# Patient Record
Sex: MALE | Race: WHITE | NOT HISPANIC OR LATINO | Employment: FULL TIME | URBAN - METROPOLITAN AREA
[De-identification: names, ages, dates, MRNs, and addresses within clinical notes are randomized per-mention and may not be internally consistent; named-entity substitution may affect disease eponyms.]

---

## 2023-09-15 PROBLEM — E78.5 HYPERLIPIDEMIA: Status: ACTIVE | Noted: 2023-09-15

## 2023-09-15 PROBLEM — I25.2 HISTORY OF ST ELEVATION MYOCARDIAL INFARCTION (STEMI): Status: ACTIVE | Noted: 2023-09-15

## 2023-09-15 PROBLEM — R21 MACULAR ERUPTION: Status: ACTIVE | Noted: 2023-09-15

## 2023-09-15 PROBLEM — R21 RASH: Status: ACTIVE | Noted: 2023-09-15

## 2023-09-15 PROBLEM — I25.10 ASHD (ARTERIOSCLEROTIC HEART DISEASE): Status: ACTIVE | Noted: 2023-09-15

## 2023-09-15 PROBLEM — Z91.89 AT RISK FOR BLEEDING: Status: ACTIVE | Noted: 2023-09-15

## 2023-09-15 PROBLEM — I21.3 ACUTE ST SEGMENT ELEVATION MYOCARDIAL INFARCTION (MULTI): Status: ACTIVE | Noted: 2023-09-15

## 2023-09-15 PROBLEM — I82.90 VENOUS THROMBOSIS: Status: ACTIVE | Noted: 2019-04-09

## 2023-09-15 PROBLEM — R07.9 CHEST PAIN: Status: ACTIVE | Noted: 2023-09-15

## 2023-09-15 PROBLEM — I25.5 ISCHEMIC CARDIOMYOPATHY: Status: ACTIVE | Noted: 2023-09-15

## 2023-09-15 RX ORDER — CARVEDILOL 3.12 MG/1
3.12 TABLET ORAL 2 TIMES DAILY
COMMUNITY
End: 2023-10-17 | Stop reason: SDUPTHER

## 2023-09-15 RX ORDER — FAMOTIDINE 20 MG/1
20 TABLET, FILM COATED ORAL DAILY
COMMUNITY
End: 2023-10-17 | Stop reason: WASHOUT

## 2023-09-15 RX ORDER — ASPIRIN 81 MG/1
81 TABLET ORAL DAILY
COMMUNITY
End: 2023-10-17 | Stop reason: SDUPTHER

## 2023-09-15 RX ORDER — DIPHENHYDRAMINE HCL 25 MG
25 CAPSULE ORAL EVERY 6 HOURS PRN
COMMUNITY
End: 2023-10-17 | Stop reason: WASHOUT

## 2023-09-15 RX ORDER — ATORVASTATIN CALCIUM 80 MG/1
80 TABLET, FILM COATED ORAL DAILY
COMMUNITY
End: 2023-10-17 | Stop reason: SDUPTHER

## 2023-10-17 ENCOUNTER — OFFICE VISIT (OUTPATIENT)
Dept: CARDIOLOGY | Facility: CLINIC | Age: 48
End: 2023-10-17
Payer: COMMERCIAL

## 2023-10-17 VITALS
SYSTOLIC BLOOD PRESSURE: 128 MMHG | DIASTOLIC BLOOD PRESSURE: 90 MMHG | OXYGEN SATURATION: 96 % | WEIGHT: 251.8 LBS | BODY MASS INDEX: 38.16 KG/M2 | HEIGHT: 68 IN | HEART RATE: 106 BPM

## 2023-10-17 DIAGNOSIS — E78.49 OTHER HYPERLIPIDEMIA: ICD-10-CM

## 2023-10-17 DIAGNOSIS — I25.10 ASHD (ARTERIOSCLEROTIC HEART DISEASE): ICD-10-CM

## 2023-10-17 DIAGNOSIS — I25.2 HISTORY OF ST ELEVATION MYOCARDIAL INFARCTION (STEMI): ICD-10-CM

## 2023-10-17 DIAGNOSIS — E78.5 HYPERLIPIDEMIA, UNSPECIFIED HYPERLIPIDEMIA TYPE: ICD-10-CM

## 2023-10-17 DIAGNOSIS — I25.10 ASHD (ARTERIOSCLEROTIC HEART DISEASE): Primary | ICD-10-CM

## 2023-10-17 PROCEDURE — 99204 OFFICE O/P NEW MOD 45 MIN: CPT | Performed by: INTERNAL MEDICINE

## 2023-10-17 RX ORDER — CARVEDILOL 3.12 MG/1
3.12 TABLET ORAL 2 TIMES DAILY
Qty: 180 TABLET | Refills: 3 | Status: SHIPPED | OUTPATIENT
Start: 2023-10-17 | End: 2024-10-16

## 2023-10-17 RX ORDER — ATORVASTATIN CALCIUM 80 MG/1
80 TABLET, FILM COATED ORAL DAILY
Qty: 90 TABLET | Refills: 3 | Status: SHIPPED | OUTPATIENT
Start: 2023-10-17 | End: 2024-10-16

## 2023-10-17 RX ORDER — ASPIRIN 81 MG/1
81 TABLET ORAL DAILY
Qty: 90 TABLET | Refills: 3 | Status: SHIPPED | OUTPATIENT
Start: 2023-10-17 | End: 2024-10-16

## 2023-10-17 NOTE — LETTER
October 17, 2023       No Recipients    Patient: Gonzalo Lopez   YOB: 1975   Date of Visit: 10/17/2023       Dear Dr. Mcgrath Recipients:    Thank you for referring Gonzalo Lopez to me for evaluation. Below are my notes for this consultation.  If you have questions, please do not hesitate to call me. I look forward to following your patient along with you.       Sincerely,     Moisés Flanagan MD      CC:   No Recipients  ______________________________________________________________________________________    Subjective  Patient ID: Gonzalo Lopez is a 48 y.o. male who presents for historyof MI, Chest Pain, Cardiomyopathy, Hyperlipidemia, and ASHD (Cardiac check up).  Chest Pain     His past medical history is significant for hyperlipidemia.   Hyperlipidemia  Associated symptoms include chest pain.     Patient is a 48-year-old male with a history of hyperlipidemia, coronary artery disease, old myocardial infarction who presents for cardiac evaluation.  He was previously seen at [facility] working out of [place].  He presented with an inferior ST elevation MI.  He had disease in the LAD and RCA.  He underwent revascularization of both of these vessels.  He had LV dysfunction on repeat echocardiogram his LV function had improved to normal.  He is remained on medical therapy including carvedilol, atorvastatin and aspirin therapy.    He denies any significant chest pressure.  He admits to a rare left-sided chest discomfort that he is relating to his back.  He otherwise denies any shortness of breath, dyspnea on exertion, palpitations, syncope, orthopnea, PND, lower extremity edema.  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    Twelve-lead ECG demonstrates normal sinus rhythm, otherwise normal.  Echocardiogram from April 2019 with an ejection fraction of 55 to 60%.  Cardiac catheterization report from 2019 reviewed.    Review  of Systems   Cardiovascular:  Positive for chest pain.   All other systems reviewed and are negative.      Objective  Physical Exam  Vitals reviewed.   Constitutional:       Appearance: Normal appearance.   HENT:      Head: Normocephalic and atraumatic.      Mouth/Throat:      Mouth: Mucous membranes are moist.      Pharynx: Oropharynx is clear.   Cardiovascular:      Rate and Rhythm: Normal rate and regular rhythm.      Pulses: Normal pulses.      Heart sounds: Normal heart sounds.   Pulmonary:      Effort: Pulmonary effort is normal.      Breath sounds: Normal breath sounds.   Abdominal:      General: Bowel sounds are normal.      Palpations: Abdomen is soft.   Musculoskeletal:      Cervical back: Neck supple.   Skin:     General: Skin is warm and dry.   Neurological:      General: No focal deficit present.      Mental Status: He is alert.   Psychiatric:         Mood and Affect: Mood normal.         Behavior: Behavior normal.         Assessment/Plan  Problem List Items Addressed This Visit             ICD-10-CM       Cardiac and Vasculature    ASHD (arteriosclerotic heart disease) - Primary I25.10    Relevant Orders    Lipid Panel    Transthoracic Echo (TTE) Complete    Stress Test    History of ST elevation myocardial infarction (STEMI) I25.2    Relevant Orders    Transthoracic Echo (TTE) Complete    Stress Test    Hyperlipidemia E78.5    Relevant Orders    Lipid Panel    Stress Test   Patient is doing reasonably well from a cardiac standpoint.    Patient would like to start working out again.  To this end, we will go ahead and pursue echocardiogram to ensure that his LV function is normal.  We will pursue a regular treadmill stress test to gauge his exercise tolerance as well as to ensure that there are no ischemic changes.    We will go ahead and check a lipid panel to ensure that his LDL is less than 70.    Patient will otherwise continue beta-blockade, aspirin and statin therapy.    Patient will follow-up  with us in 6 months or sooner if he has more problems.

## 2023-10-17 NOTE — PROGRESS NOTES
Subjective   Patient ID: Gonzalo Lopez is a 48 y.o. male who presents for historyof MI, Chest Pain, Cardiomyopathy, Hyperlipidemia, and ASHD (Cardiac check up).  Chest Pain     His past medical history is significant for hyperlipidemia.   Hyperlipidemia  Associated symptoms include chest pain.     Patient is a 48-year-old male with a history of hyperlipidemia, coronary artery disease, old myocardial infarction who presents for cardiac evaluation.  He was previously seen at [facility] working out of [place].  He presented with an inferior ST elevation MI.  He had disease in the LAD and RCA.  He underwent revascularization of both of these vessels.  He had LV dysfunction on repeat echocardiogram his LV function had improved to normal.  He is remained on medical therapy including carvedilol, atorvastatin and aspirin therapy.    He denies any significant chest pressure.  He admits to a rare left-sided chest discomfort that he is relating to his back.  He otherwise denies any shortness of breath, dyspnea on exertion, palpitations, syncope, orthopnea, PND, lower extremity edema.  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    Twelve-lead ECG demonstrates normal sinus rhythm, otherwise normal.  Echocardiogram from April 2019 with an ejection fraction of 55 to 60%.  Cardiac catheterization report from 2019 reviewed.    Review of Systems   Cardiovascular:  Positive for chest pain.   All other systems reviewed and are negative.      Objective   Physical Exam  Vitals reviewed.   Constitutional:       Appearance: Normal appearance.   HENT:      Head: Normocephalic and atraumatic.      Mouth/Throat:      Mouth: Mucous membranes are moist.      Pharynx: Oropharynx is clear.   Cardiovascular:      Rate and Rhythm: Normal rate and regular rhythm.      Pulses: Normal pulses.      Heart sounds: Normal heart sounds.   Pulmonary:      Effort: Pulmonary  effort is normal.      Breath sounds: Normal breath sounds.   Abdominal:      General: Bowel sounds are normal.      Palpations: Abdomen is soft.   Musculoskeletal:      Cervical back: Neck supple.   Skin:     General: Skin is warm and dry.   Neurological:      General: No focal deficit present.      Mental Status: He is alert.   Psychiatric:         Mood and Affect: Mood normal.         Behavior: Behavior normal.         Assessment/Plan   Problem List Items Addressed This Visit             ICD-10-CM       Cardiac and Vasculature    ASHD (arteriosclerotic heart disease) - Primary I25.10    Relevant Orders    Lipid Panel    Transthoracic Echo (TTE) Complete    Stress Test    History of ST elevation myocardial infarction (STEMI) I25.2    Relevant Orders    Transthoracic Echo (TTE) Complete    Stress Test    Hyperlipidemia E78.5    Relevant Orders    Lipid Panel    Stress Test   Patient is doing reasonably well from a cardiac standpoint.    Patient would like to start working out again.  To this end, we will go ahead and pursue echocardiogram to ensure that his LV function is normal.  We will pursue a regular treadmill stress test to gauge his exercise tolerance as well as to ensure that there are no ischemic changes.    We will go ahead and check a lipid panel to ensure that his LDL is less than 70.    Patient will otherwise continue beta-blockade, aspirin and statin therapy.    Patient will follow-up with us in 6 months or sooner if he has more problems.

## 2023-11-11 ENCOUNTER — LAB (OUTPATIENT)
Dept: LAB | Facility: LAB | Age: 48
End: 2023-11-11
Payer: COMMERCIAL

## 2023-11-11 DIAGNOSIS — I25.10 ASHD (ARTERIOSCLEROTIC HEART DISEASE): ICD-10-CM

## 2023-11-11 DIAGNOSIS — E78.49 OTHER HYPERLIPIDEMIA: ICD-10-CM

## 2023-11-11 LAB
CHOLEST SERPL-MCNC: 190 MG/DL (ref 0–199)
CHOLESTEROL/HDL RATIO: 3.9
HDLC SERPL-MCNC: 48.7 MG/DL
LDLC SERPL CALC-MCNC: 120 MG/DL
NON HDL CHOLESTEROL: 141 MG/DL (ref 0–149)
TRIGL SERPL-MCNC: 108 MG/DL (ref 0–149)
VLDL: 22 MG/DL (ref 0–40)

## 2023-11-11 PROCEDURE — 80061 LIPID PANEL: CPT

## 2023-11-11 PROCEDURE — 36415 COLL VENOUS BLD VENIPUNCTURE: CPT

## 2023-11-13 DIAGNOSIS — E78.49 OTHER HYPERLIPIDEMIA: ICD-10-CM

## 2023-11-13 RX ORDER — EVOLOCUMAB 140 MG/ML
140 INJECTION, SOLUTION SUBCUTANEOUS
Qty: 2 ML | Refills: 11 | Status: SHIPPED | OUTPATIENT
Start: 2023-11-13 | End: 2024-11-12

## 2023-11-13 NOTE — PROGRESS NOTES
SALLY Russo MD; Madelaine Vivas, RN  Are we taking him off the atorvastatin? I will pend and send the Repatha to  [facility] to initiate the auth.     Message  Received: Yesterday  MD Madelaine Kamara, RN  Cc: SALLY Russo    Preaut for PCSK-9 inhibitor - either Repatha or Praulent.    LDL is 120 despite high dose statin. Goal is less than 70.    PP          Previous Messages     Contains abnormal data Lipid Panel  Order: [id number]  Status: Final result       Visible to patient: Yes (seen)       Dx: ASHD (arteriosclerotic heart disease)...    0 Result Notes       1 Follow-up Encounter       1  Topic       Component  Ref Range & Units 2 d ago 4 yr ago   Cholesterol  0 - 199 mg/dL 190 241 High  R   Comment:      Age      Desirable   Borderline High   High     0-19 Y     0 - 169       170 - 199     >/= 200    20-24 Y     0 - 189       190 - 224     >/= 225        >24 Y     0 - 199       200 - 239     >/= 240   **All ranges are based on fasting samples. Specific   therapeutic targets will vary based on patient-specific   cardiac risk.    Pediatric guidelines reference:Pediatrics 2011, 128(S5).Adult guidelines reference: NCEP ATPIII Guidelines,ABBY 2001, 258:2486-97    Venipuncture immediately after or during the administration of Metamizole may lead to falsely low results. Testing should be performed immediately prior to Metamizole dosing.   HDL-Cholesterol  mg/dL 48.7 51 R, CM   Comment:  Age       Very Low   Low     Normal    High    0-19 Y    < 35      < 40     40-45     ----  20-24 Y    ----     < 40      >45      ----        >24 Y      ----     < 40     40-60      >60   Cholesterol/HDL Ratio 3.9 4.7 R, CM   Comment:  Ref Values  Desirable  < 3.4  High Risk  > 5.0   LDL Calculated  <=99 mg/dL 120 High  152 High  R   Comment:                            Near   Borderline      AGE      Desirable  Optimal    High     High     Very High     0-19  Y     0 - 109     ---    110-129   >/= 130     ----    20-24 Y     0 - 119     ---    120-159   >/= 160     ----      >24 Y     0 -  99   100-129  130-159   160-189     >/=190   VLDL  0 - 40 mg/dL 22    Triglycerides  0 - 149 mg/dL 108 192 High  R   Comment:   Age         Desirable   Borderline High   High     Very High   0 D-90 D    19 - 174         ----         ----        ----  91 D- 9 Y     0 -  74        75 -  99     >/= 100      ----    10-19 Y     0 -  89        90 - 129     >/= 130      ----    20-24 Y     0 - 114       115 - 149     >/= 150      ----        >24 Y     0 - 149       150 - 199    200- 499    >/= 500    Venipuncture immediately after or during the administration of Metamizole may lead to falsely low results. Testing should be performed immediately prior to Metamizole dosing.   Non HDL Cholesterol  0 - 149 mg/dL 141    Comment:      Age       Desirable   Borderline High   High     Very High     0-19 Y     0 - 119       120 - 144     >/= 145    >/= 160    20-24 Y     0 - 149       150 - 189     >/= 190      ----        >24 Y    30 mg/dL above LDL Cholesterol goal   Resulting Agency [facility]              Specimen Collected: 11/11/23 08:12 Last Resulted: 11/11/23 17:05        Lab Flowsheet        Order Details        View Encounter        Lab and Collection Details        Routing        Result History     View All Conversations on this Encounter        CM=Additional comments  R=Reference range differs from displayed range        Result Care Coordination      Patient Communication     Add Comments   Seen Back to Top        Follow-up Encounters    11/11/2023 Lab Back to Top        Satisfied Health Maintenance Topics     Back to Top  Lipid Panel (Every 5 Years)  Next due on 11/11/2028  Address Topic

## 2023-11-14 ENCOUNTER — SPECIALTY PHARMACY (OUTPATIENT)
Dept: PHARMACY | Facility: CLINIC | Age: 48
End: 2023-11-14

## 2023-11-17 ENCOUNTER — HOSPITAL ENCOUNTER (OUTPATIENT)
Dept: CARDIOLOGY | Facility: CLINIC | Age: 48
Discharge: HOME | End: 2023-11-17
Payer: COMMERCIAL

## 2023-11-17 DIAGNOSIS — E78.49 OTHER HYPERLIPIDEMIA: ICD-10-CM

## 2023-11-17 DIAGNOSIS — E78.5 HYPERLIPIDEMIA, UNSPECIFIED: ICD-10-CM

## 2023-11-17 DIAGNOSIS — I25.2 HISTORY OF ST ELEVATION MYOCARDIAL INFARCTION (STEMI): ICD-10-CM

## 2023-11-17 DIAGNOSIS — I25.10 ASHD (ARTERIOSCLEROTIC HEART DISEASE): ICD-10-CM

## 2023-11-17 PROCEDURE — 93016 CV STRESS TEST SUPVJ ONLY: CPT | Performed by: INTERNAL MEDICINE

## 2023-11-17 PROCEDURE — 93017 CV STRESS TEST TRACING ONLY: CPT

## 2023-11-17 PROCEDURE — 93018 CV STRESS TEST I&R ONLY: CPT | Performed by: INTERNAL MEDICINE

## 2023-12-04 ENCOUNTER — PHARMACY VISIT (OUTPATIENT)
Dept: PHARMACY | Facility: CLINIC | Age: 48
End: 2023-12-04
Payer: COMMERCIAL

## 2023-12-06 ENCOUNTER — SPECIALTY PHARMACY (OUTPATIENT)
Dept: PHARMACY | Facility: CLINIC | Age: 48
End: 2023-12-06

## 2023-12-11 PROCEDURE — RXMED WILLOW AMBULATORY MEDICATION CHARGE

## 2023-12-19 ENCOUNTER — TELEPHONE (OUTPATIENT)
Dept: CARDIOLOGY | Facility: CLINIC | Age: 48
End: 2023-12-19
Payer: COMMERCIAL

## 2023-12-19 ENCOUNTER — PHARMACY VISIT (OUTPATIENT)
Dept: PHARMACY | Facility: CLINIC | Age: 48
End: 2023-12-19
Payer: COMMERCIAL

## 2023-12-19 ENCOUNTER — SPECIALTY PHARMACY (OUTPATIENT)
Dept: PHARMACY | Facility: CLINIC | Age: 48
End: 2023-12-19

## 2023-12-19 NOTE — TELEPHONE ENCOUNTER
Rec'd notification from  Specialty pharmacy re: Repatha. Patient education needed.  Spoke with patient and instructed on need for Repatha due to elevated LDL despite high dose statin (Lipitor)- current  and goal LDL of <70. Patient verb understanding, all questions/concerns addressed.  Email sent to  Specialty to confirm patient consent and education provided.

## 2024-01-09 ENCOUNTER — SPECIALTY PHARMACY (OUTPATIENT)
Dept: PHARMACY | Facility: CLINIC | Age: 49
End: 2024-01-09

## 2024-01-09 PROCEDURE — RXMED WILLOW AMBULATORY MEDICATION CHARGE

## 2024-01-15 ENCOUNTER — PHARMACY VISIT (OUTPATIENT)
Dept: PHARMACY | Facility: CLINIC | Age: 49
End: 2024-01-15
Payer: COMMERCIAL

## 2024-02-01 ENCOUNTER — SPECIALTY PHARMACY (OUTPATIENT)
Dept: PHARMACY | Facility: CLINIC | Age: 49
End: 2024-02-01

## 2024-02-06 PROCEDURE — RXMED WILLOW AMBULATORY MEDICATION CHARGE

## 2024-02-14 ENCOUNTER — PHARMACY VISIT (OUTPATIENT)
Dept: PHARMACY | Facility: CLINIC | Age: 49
End: 2024-02-14
Payer: COMMERCIAL

## 2024-03-11 ENCOUNTER — SPECIALTY PHARMACY (OUTPATIENT)
Dept: PHARMACY | Facility: CLINIC | Age: 49
End: 2024-03-11

## 2024-03-11 PROCEDURE — RXMED WILLOW AMBULATORY MEDICATION CHARGE

## 2024-03-14 ENCOUNTER — PHARMACY VISIT (OUTPATIENT)
Dept: PHARMACY | Facility: CLINIC | Age: 49
End: 2024-03-14
Payer: COMMERCIAL

## 2024-04-03 ENCOUNTER — SPECIALTY PHARMACY (OUTPATIENT)
Dept: PHARMACY | Facility: CLINIC | Age: 49
End: 2024-04-03

## 2024-04-03 ENCOUNTER — PHARMACY VISIT (OUTPATIENT)
Dept: PHARMACY | Facility: CLINIC | Age: 49
End: 2024-04-03
Payer: COMMERCIAL

## 2024-04-03 PROCEDURE — RXMED WILLOW AMBULATORY MEDICATION CHARGE

## 2024-04-05 ENCOUNTER — OFFICE VISIT (OUTPATIENT)
Dept: CARDIOLOGY | Facility: CLINIC | Age: 49
End: 2024-04-05
Payer: COMMERCIAL

## 2024-04-05 ENCOUNTER — HOSPITAL ENCOUNTER (OUTPATIENT)
Dept: CARDIOLOGY | Facility: CLINIC | Age: 49
Discharge: HOME | End: 2024-04-05
Payer: COMMERCIAL

## 2024-04-05 VITALS
DIASTOLIC BLOOD PRESSURE: 80 MMHG | HEIGHT: 68 IN | SYSTOLIC BLOOD PRESSURE: 120 MMHG | BODY MASS INDEX: 33.89 KG/M2 | OXYGEN SATURATION: 95 % | HEART RATE: 69 BPM | WEIGHT: 223.6 LBS

## 2024-04-05 VITALS
BODY MASS INDEX: 33.95 KG/M2 | HEIGHT: 68 IN | DIASTOLIC BLOOD PRESSURE: 90 MMHG | WEIGHT: 224 LBS | SYSTOLIC BLOOD PRESSURE: 128 MMHG

## 2024-04-05 DIAGNOSIS — I25.10 ASHD (ARTERIOSCLEROTIC HEART DISEASE): ICD-10-CM

## 2024-04-05 DIAGNOSIS — I25.10 ASHD (ARTERIOSCLEROTIC HEART DISEASE): Primary | ICD-10-CM

## 2024-04-05 DIAGNOSIS — I25.2 HISTORY OF ST ELEVATION MYOCARDIAL INFARCTION (STEMI): ICD-10-CM

## 2024-04-05 DIAGNOSIS — R07.9 CHEST PAIN, UNSPECIFIED: ICD-10-CM

## 2024-04-05 DIAGNOSIS — E78.49 OTHER HYPERLIPIDEMIA: ICD-10-CM

## 2024-04-05 DIAGNOSIS — I21.3 ST ELEVATION (STEMI) MYOCARDIAL INFARCTION OF UNSPECIFIED SITE (MULTI): ICD-10-CM

## 2024-04-05 LAB
AORTIC VALVE MEAN GRADIENT: 3.9 MMHG
AORTIC VALVE PEAK VELOCITY: 1.44 M/S
AV PEAK GRADIENT: 8.3 MMHG
AVA (PEAK VEL): 2.74 CM2
AVA (VTI): 2.5 CM2
BODY SURFACE AREA: 2.21 M2
EJECTION FRACTION APICAL 4 CHAMBER: 47.5
LEFT ATRIUM VOLUME AREA LENGTH INDEX BSA: 25.2 ML/M2
LEFT VENTRICLE INTERNAL DIMENSION DIASTOLE: 5.68 CM (ref 3.5–6)
LEFT VENTRICULAR OUTFLOW TRACT DIAMETER: 2.11 CM
LV EJECTION FRACTION BIPLANE: 59 %
MITRAL VALVE E/A RATIO: 1.69
RIGHT VENTRICLE FREE WALL PEAK S': 1.5 CM/S
RIGHT VENTRICLE PEAK SYSTOLIC PRESSURE: 14.4 MMHG
TRICUSPID ANNULAR PLANE SYSTOLIC EXCURSION: 3 CM

## 2024-04-05 PROCEDURE — 93306 TTE W/DOPPLER COMPLETE: CPT

## 2024-04-05 PROCEDURE — 93306 TTE W/DOPPLER COMPLETE: CPT | Performed by: INTERNAL MEDICINE

## 2024-04-05 PROCEDURE — 99214 OFFICE O/P EST MOD 30 MIN: CPT | Performed by: INTERNAL MEDICINE

## 2024-04-05 NOTE — PROGRESS NOTES
[facility] Cardiology Outpatient Follow-up Visit     Reason for Visit: CAD    HPI: Gonzalo Lopez is a 48 y.o.  male who presents today for followup.     Patient is a 48-year-old male with a history of hyperlipidemia, coronary artery disease, old myocardial infarction who presents for cardiac evaluation.  He was previously seen at [facility] working out of [place].  He presented with an inferior ST elevation MI.  He had disease in the LAD and RCA.  He underwent revascularization of both of these vessels.  He had LV dysfunction on repeat echocardiogram his LV function had improved to normal.  He is remained on medical therapy including carvedilol, atorvastatin and aspirin therapy.     He denies any significant chest pressure.  He admits to a rare left-sided chest discomfort that he is relating to his back.  He otherwise denies any shortness of breath, dyspnea on exertion, palpitations, syncope, orthopnea, PND, lower extremity edema.  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     Twelve-lead ECG demonstrates normal sinus rhythm, otherwise normal.  Echocardiogram from April 2019 with an ejection fraction of 55 to 60%.  Cardiac catheterization report from 2019 reviewed.  11/17/2023 stress test: Probably normal exercise stress test with nonspecific changes in 3 and aVF at baseline.  11/11/2023 , , HDL 49, triglycerides 108.  4/5/2024 echo: Ejection fraction 55 to 60%.      Past Medical History:   He has no past medical history on file.    Surgical History:   He has no past surgical history on file.    Family History:   No family history on file.    Allergies:  Amoxicillin, Clindamycin, and Penicillins     Social History:   Former smoker.    Prior Cardiovascular Testing (Personally Reviewed):     Review of Systems:  Review of Systems   All other systems reviewed and are negative.      Outpatient Medications:    Current  Outpatient Medications:     aspirin 81 mg EC tablet, Take 1 tablet (81 mg) by mouth once daily., Disp: 90 tablet, Rfl: 3    atorvastatin (Lipitor) 80 mg tablet, Take 1 tablet (80 mg) by mouth once daily., Disp: 90 tablet, Rfl: 3    carvedilol (Coreg) 3.125 mg tablet, Take 1 tablet (3.125 mg) by mouth 2 times a day., Disp: 180 tablet, Rfl: 3    evolocumab (Repatha SureClick) 140 mg/mL injection, Inject 1 mL (140 mg) under the skin every 14 (fourteen) days., Disp: 2 mL, Rfl: 11     Last Recorded Vitals  There were no vitals taken for this visit.    Physical Exam:    Physical Exam  Vitals reviewed.   Constitutional:       Appearance: Normal appearance.   HENT:      Head: Normocephalic and atraumatic.      Mouth/Throat:      Mouth: Mucous membranes are moist.      Pharynx: Oropharynx is clear.   Eyes:      Extraocular Movements: Extraocular movements intact.      Conjunctiva/sclera: Conjunctivae normal.   Cardiovascular:      Rate and Rhythm: Normal rate and regular rhythm.      Pulses: Normal pulses.      Heart sounds: Normal heart sounds.   Pulmonary:      Effort: Pulmonary effort is normal.      Breath sounds: Normal breath sounds.   Abdominal:      General: Bowel sounds are normal.      Palpations: Abdomen is soft.   Musculoskeletal:      Cervical back: Neck supple.   Skin:     General: Skin is warm and dry.   Neurological:      General: No focal deficit present.      Mental Status: He is alert.   Psychiatric:         Mood and Affect: Mood normal.         Behavior: Behavior normal.         Lab/Radiology/Diagnostic Review:    Labs    Lab Results   Component Value Date    GLUCOSE 105 (H) 11/18/2019    CALCIUM 9.9 11/18/2019     11/18/2019    K 3.9 11/18/2019    CO2 24 11/18/2019     11/18/2019    BUN 20 11/18/2019    CREATININE 1.1 11/18/2019       Lab Results   Component Value Date    WBC 8.7 11/18/2019    HGB 14.7 11/18/2019    HCT 43.5 11/18/2019    MCV 83.5 11/18/2019     11/18/2019       Lab  "Results   Component Value Date    CHOL 190 11/11/2023    CHOL 241 (H) 04/10/2019     Lab Results   Component Value Date    HDL 48.7 11/11/2023    HDL 51 04/10/2019     Lab Results   Component Value Date    LDLCALC 120 (H) 11/11/2023    LDLCALC 152 (H) 04/10/2019     Lab Results   Component Value Date    TRIG 108 11/11/2023    TRIG 192 (H) 04/10/2019     No components found for: \"CHOLHDL\"    No results found for: \"BNP\"    No results found for: \"TSH\"    Assessment:   Patient is doing reasonably well from a cardiac standpoint.     Patient would like to start working out again.  To this end, we will go ahead and pursue echocardiogram to ensure that his LV function is normal.  We will pursue a regular treadmill stress test to gauge his exercise tolerance as well as to ensure that there are no ischemic changes.     Patient's LDL is 120 on statin therapy.  We started him on a PCSK9 inhibitor.     Patient will otherwise continue beta-blockade, aspirin and statin therapy.     Patient will follow-up with us in 12 months or sooner if he has more problems.    Moisés Flanagan MD      "

## 2024-04-05 NOTE — LETTER
April 5, 2024       No Recipients    Patient: Gonzalo Lopez   YOB: 1975   Date of Visit: 4/5/2024       Dear Dr. Mcgrath Recipients:    Thank you for referring Gonzalo Lopez to me for evaluation. Below are my notes for this consultation.  If you have questions, please do not hesitate to call me. I look forward to following your patient along with you.       Sincerely,     Moisés Flanagan MD      CC:   No Recipients  ______________________________________________________________________________________        [facility] Cardiology Outpatient Follow-up Visit     Reason for Visit: CAD    HPI: Gonzalo Lopez is a 48 y.o.  male who presents today for followup.     Patient is a 48-year-old male with a history of hyperlipidemia, coronary artery disease, old myocardial infarction who presents for cardiac evaluation.  He was previously seen at [facility] working out of [place].  He presented with an inferior ST elevation MI.  He had disease in the LAD and RCA.  He underwent revascularization of both of these vessels.  He had LV dysfunction on repeat echocardiogram his LV function had improved to normal.  He is remained on medical therapy including carvedilol, atorvastatin and aspirin therapy.     He denies any significant chest pressure.  He admits to a rare left-sided chest discomfort that he is relating to his back.  He otherwise denies any shortness of breath, dyspnea on exertion, palpitations, syncope, orthopnea, PND, lower extremity edema.  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     Twelve-lead ECG demonstrates normal sinus rhythm, otherwise normal.  Echocardiogram from April 2019 with an ejection fraction of 55 to 60%.  Cardiac catheterization report from 2019 reviewed.  11/17/2023 stress test: Probably normal exercise stress test with nonspecific changes in 3 and aVF at baseline.  11/11/2023 , , HDL 49,  triglycerides 108.  4/5/2024 echo: Ejection fraction 55 to 60%.      Past Medical History:   He has no past medical history on file.    Surgical History:   He has no past surgical history on file.    Family History:   No family history on file.    Allergies:  Amoxicillin, Clindamycin, and Penicillins     Social History:   Former smoker.    Prior Cardiovascular Testing (Personally Reviewed):     Review of Systems:  Review of Systems   All other systems reviewed and are negative.      Outpatient Medications:    Current Outpatient Medications:   •  aspirin 81 mg EC tablet, Take 1 tablet (81 mg) by mouth once daily., Disp: 90 tablet, Rfl: 3  •  atorvastatin (Lipitor) 80 mg tablet, Take 1 tablet (80 mg) by mouth once daily., Disp: 90 tablet, Rfl: 3  •  carvedilol (Coreg) 3.125 mg tablet, Take 1 tablet (3.125 mg) by mouth 2 times a day., Disp: 180 tablet, Rfl: 3  •  evolocumab (Repatha SureClick) 140 mg/mL injection, Inject 1 mL (140 mg) under the skin every 14 (fourteen) days., Disp: 2 mL, Rfl: 11     Last Recorded Vitals  There were no vitals taken for this visit.    Physical Exam:    Physical Exam  Vitals reviewed.   Constitutional:       Appearance: Normal appearance.   HENT:      Head: Normocephalic and atraumatic.      Mouth/Throat:      Mouth: Mucous membranes are moist.      Pharynx: Oropharynx is clear.   Eyes:      Extraocular Movements: Extraocular movements intact.      Conjunctiva/sclera: Conjunctivae normal.   Cardiovascular:      Rate and Rhythm: Normal rate and regular rhythm.      Pulses: Normal pulses.      Heart sounds: Normal heart sounds.   Pulmonary:      Effort: Pulmonary effort is normal.      Breath sounds: Normal breath sounds.   Abdominal:      General: Bowel sounds are normal.      Palpations: Abdomen is soft.   Musculoskeletal:      Cervical back: Neck supple.   Skin:     General: Skin is warm and dry.   Neurological:      General: No focal deficit present.      Mental Status: He is alert.  "  Psychiatric:         Mood and Affect: Mood normal.         Behavior: Behavior normal.         Lab/Radiology/Diagnostic Review:    Labs    Lab Results   Component Value Date    GLUCOSE 105 (H) 11/18/2019    CALCIUM 9.9 11/18/2019     11/18/2019    K 3.9 11/18/2019    CO2 24 11/18/2019     11/18/2019    BUN 20 11/18/2019    CREATININE 1.1 11/18/2019       Lab Results   Component Value Date    WBC 8.7 11/18/2019    HGB 14.7 11/18/2019    HCT 43.5 11/18/2019    MCV 83.5 11/18/2019     11/18/2019       Lab Results   Component Value Date    CHOL 190 11/11/2023    CHOL 241 (H) 04/10/2019     Lab Results   Component Value Date    HDL 48.7 11/11/2023    HDL 51 04/10/2019     Lab Results   Component Value Date    LDLCALC 120 (H) 11/11/2023    LDLCALC 152 (H) 04/10/2019     Lab Results   Component Value Date    TRIG 108 11/11/2023    TRIG 192 (H) 04/10/2019     No components found for: \"CHOLHDL\"    No results found for: \"BNP\"    No results found for: \"TSH\"    Assessment:   Patient is doing reasonably well from a cardiac standpoint.     Patient would like to start working out again.  To this end, we will go ahead and pursue echocardiogram to ensure that his LV function is normal.  We will pursue a regular treadmill stress test to gauge his exercise tolerance as well as to ensure that there are no ischemic changes.     Patient's LDL is 120 on statin therapy.  We started him on a PCSK9 inhibitor.     Patient will otherwise continue beta-blockade, aspirin and statin therapy.     Patient will follow-up with us in 12 months or sooner if he has more problems.    Moisés Flanagan MD    "

## 2024-04-30 ENCOUNTER — SPECIALTY PHARMACY (OUTPATIENT)
Dept: PHARMACY | Facility: CLINIC | Age: 49
End: 2024-04-30

## 2024-04-30 PROCEDURE — RXMED WILLOW AMBULATORY MEDICATION CHARGE

## 2024-05-08 ENCOUNTER — PHARMACY VISIT (OUTPATIENT)
Dept: PHARMACY | Facility: CLINIC | Age: 49
End: 2024-05-08
Payer: COMMERCIAL

## 2024-05-30 PROCEDURE — RXMED WILLOW AMBULATORY MEDICATION CHARGE

## 2024-05-31 ENCOUNTER — PHARMACY VISIT (OUTPATIENT)
Dept: PHARMACY | Facility: CLINIC | Age: 49
End: 2024-05-31
Payer: COMMERCIAL

## 2024-07-15 PROCEDURE — RXMED WILLOW AMBULATORY MEDICATION CHARGE

## 2024-07-16 ENCOUNTER — PHARMACY VISIT (OUTPATIENT)
Dept: PHARMACY | Facility: CLINIC | Age: 49
End: 2024-07-16
Payer: COMMERCIAL

## 2024-07-23 DIAGNOSIS — I25.10 ASHD (ARTERIOSCLEROTIC HEART DISEASE): ICD-10-CM

## 2024-07-24 RX ORDER — CARVEDILOL 3.12 MG/1
3.12 TABLET ORAL 2 TIMES DAILY
Qty: 180 TABLET | Refills: 3 | Status: SHIPPED | OUTPATIENT
Start: 2024-07-24 | End: 2025-07-24

## 2024-08-06 ENCOUNTER — PHARMACY VISIT (OUTPATIENT)
Dept: PHARMACY | Facility: CLINIC | Age: 49
End: 2024-08-06
Payer: COMMERCIAL

## 2024-08-06 PROCEDURE — RXMED WILLOW AMBULATORY MEDICATION CHARGE

## 2024-09-03 ENCOUNTER — PHARMACY VISIT (OUTPATIENT)
Dept: PHARMACY | Facility: CLINIC | Age: 49
End: 2024-09-03
Payer: COMMERCIAL

## 2024-09-03 PROCEDURE — RXMED WILLOW AMBULATORY MEDICATION CHARGE

## 2024-09-16 ENCOUNTER — APPOINTMENT (OUTPATIENT)
Dept: ORTHOPEDIC SURGERY | Facility: CLINIC | Age: 49
End: 2024-09-16
Payer: COMMERCIAL

## 2024-09-16 DIAGNOSIS — M25.511 ACUTE PAIN OF RIGHT SHOULDER: ICD-10-CM

## 2024-10-01 PROCEDURE — RXMED WILLOW AMBULATORY MEDICATION CHARGE

## 2024-10-02 ENCOUNTER — PHARMACY VISIT (OUTPATIENT)
Dept: PHARMACY | Facility: CLINIC | Age: 49
End: 2024-10-02
Payer: COMMERCIAL

## 2024-10-23 ENCOUNTER — SPECIALTY PHARMACY (OUTPATIENT)
Dept: PHARMACY | Facility: CLINIC | Age: 49
End: 2024-10-23

## 2024-10-23 PROCEDURE — RXMED WILLOW AMBULATORY MEDICATION CHARGE

## 2024-10-24 ENCOUNTER — PHARMACY VISIT (OUTPATIENT)
Dept: PHARMACY | Facility: CLINIC | Age: 49
End: 2024-10-24
Payer: COMMERCIAL

## 2024-11-05 ENCOUNTER — SPECIALTY PHARMACY (OUTPATIENT)
Dept: PHARMACY | Facility: CLINIC | Age: 49
End: 2024-11-05

## 2024-11-07 DIAGNOSIS — E78.49 OTHER HYPERLIPIDEMIA: ICD-10-CM

## 2024-11-07 RX ORDER — EVOLOCUMAB 140 MG/ML
140 INJECTION, SOLUTION SUBCUTANEOUS
Qty: 2 ML | Refills: 11 | Status: SHIPPED | OUTPATIENT
Start: 2024-11-07 | End: 2025-11-07

## 2024-11-13 PROCEDURE — RXMED WILLOW AMBULATORY MEDICATION CHARGE

## 2024-11-19 ENCOUNTER — PHARMACY VISIT (OUTPATIENT)
Dept: PHARMACY | Facility: CLINIC | Age: 49
End: 2024-11-19
Payer: COMMERCIAL

## 2025-01-10 DIAGNOSIS — E78.5 HYPERLIPIDEMIA, UNSPECIFIED HYPERLIPIDEMIA TYPE: ICD-10-CM

## 2025-01-10 DIAGNOSIS — I25.10 ASHD (ARTERIOSCLEROTIC HEART DISEASE): ICD-10-CM

## 2025-01-10 RX ORDER — CARVEDILOL 3.12 MG/1
3.12 TABLET ORAL 2 TIMES DAILY
Qty: 180 TABLET | Refills: 3 | Status: SHIPPED | OUTPATIENT
Start: 2025-01-10 | End: 2026-01-10

## 2025-01-10 RX ORDER — ATORVASTATIN CALCIUM 80 MG/1
80 TABLET, FILM COATED ORAL DAILY
Qty: 90 TABLET | Refills: 3 | Status: SHIPPED | OUTPATIENT
Start: 2025-01-10 | End: 2026-01-10

## 2025-04-04 ENCOUNTER — APPOINTMENT (OUTPATIENT)
Dept: CARDIOLOGY | Facility: CLINIC | Age: 50
End: 2025-04-04
Payer: COMMERCIAL

## 2025-04-14 ENCOUNTER — OFFICE VISIT (OUTPATIENT)
Dept: CARDIOLOGY | Facility: CLINIC | Age: 50
End: 2025-04-14
Payer: COMMERCIAL

## 2025-04-14 VITALS
OXYGEN SATURATION: 97 % | WEIGHT: 214 LBS | BODY MASS INDEX: 32.54 KG/M2 | HEART RATE: 109 BPM | SYSTOLIC BLOOD PRESSURE: 125 MMHG | DIASTOLIC BLOOD PRESSURE: 80 MMHG

## 2025-04-14 DIAGNOSIS — E78.00 PURE HYPERCHOLESTEROLEMIA: ICD-10-CM

## 2025-04-14 DIAGNOSIS — I25.10 ASHD (ARTERIOSCLEROTIC HEART DISEASE): Primary | ICD-10-CM

## 2025-04-14 DIAGNOSIS — I25.5 ISCHEMIC CARDIOMYOPATHY: ICD-10-CM

## 2025-04-14 DIAGNOSIS — R07.9 CHEST PAIN, UNSPECIFIED TYPE: ICD-10-CM

## 2025-04-14 DIAGNOSIS — I25.2 HISTORY OF ST ELEVATION MYOCARDIAL INFARCTION (STEMI): ICD-10-CM

## 2025-04-14 DIAGNOSIS — I21.29 ACUTE ST ELEVATION MYOCARDIAL INFARCTION (STEMI) INVOLVING OTHER CORONARY ARTERY: ICD-10-CM

## 2025-04-14 PROCEDURE — 93005 ELECTROCARDIOGRAM TRACING: CPT | Performed by: INTERNAL MEDICINE

## 2025-04-14 PROCEDURE — 99214 OFFICE O/P EST MOD 30 MIN: CPT | Performed by: INTERNAL MEDICINE

## 2025-04-14 PROCEDURE — 1036F TOBACCO NON-USER: CPT | Performed by: INTERNAL MEDICINE

## 2025-04-14 PROCEDURE — 93010 ELECTROCARDIOGRAM REPORT: CPT | Performed by: INTERNAL MEDICINE

## 2025-04-14 PROCEDURE — 99214 OFFICE O/P EST MOD 30 MIN: CPT | Mod: 25 | Performed by: INTERNAL MEDICINE

## 2025-04-14 RX ORDER — ASPIRIN 81 MG/1
1 TABLET ORAL DAILY
COMMUNITY

## 2025-04-14 NOTE — PROGRESS NOTES
Referred by Dr. Ahmadi for No chief complaint on file.     History Of Present Illness:    Gonzalo Lopez is a 49 y.o. male with history of hyperlipidemia/coronary disease (status post inferior ST elevation MI with PCI to the LAD and RCA/recovered LV systolic function presenting for cardiac evaluation  Previously saw Dr. Keen    Patient denies chest pain/SOB/palpitations/dizziness/lightheadedness/edema/claudication    Active-/light cardio at home/plays softball        Past Medical History:  He has no past medical history on file.    Past Surgical History:  He has no past surgical history on file.      Social History:  He reports that he has never smoked. He has never used smokeless tobacco. No history on file for alcohol use and drug use.    Family History:  No family history on file.     Allergies:  Amoxicillin, Clindamycin, and Penicillins    Outpatient Medications:  Current Outpatient Medications   Medication Instructions    aspirin 81 mg EC tablet 1 tablet, Daily    atorvastatin (LIPITOR) 80 mg, oral, Daily    carvedilol (COREG) 3.125 mg, oral, 2 times daily    evolocumab (Repatha SureClick) 140 mg/mL injection Inject 140 mg (1 pen) under the skin every 14 (fourteen) days.        Last Recorded Vitals:  Vitals:    04/14/25 1550   BP: 126/74   BP Location: Left arm   Patient Position: Sitting   Pulse: 109   SpO2: 97%   Weight: 97.1 kg (214 lb)       Physical Exam:  Constitutional:       Appearance: Healthy appearance. Overweight and not in distress.   Neck:      Vascular: No JVR. JVD normal.   Pulmonary:      Effort: Pulmonary effort is normal.      Breath sounds: Normal breath sounds. No wheezing. No rhonchi. No rales.   Chest:      Chest wall: Not tender to palpatation.   Cardiovascular:      PMI at left midclavicular line. Normal rate. Regular rhythm. Normal S1. Normal S2.       Murmurs: There is no murmur.      No gallop.  No click. No rub.   Pulses:     Intact distal pulses.   Edema:      "Peripheral edema absent.   Abdominal:      General: Bowel sounds are normal.      Palpations: Abdomen is soft.      Tenderness: There is no abdominal tenderness.   Musculoskeletal: Normal range of motion.         General: No tenderness. Skin:     General: Skin is warm and dry.   Neurological:      General: No focal deficit present.      Mental Status: Alert and oriented to person, place and time.       @PESEC->PESEC(CIRCULAR REFERENCE)@         Last Labs:  CBC -  Lab Results   Component Value Date    WBC 8.7 11/18/2019    HGB 14.7 11/18/2019    HCT 43.5 11/18/2019    MCV 83.5 11/18/2019     11/18/2019       CMP -  Lab Results   Component Value Date    CALCIUM 9.9 11/18/2019       LIPID PANEL -   Lab Results   Component Value Date    CHOL 190 11/11/2023    TRIG 108 11/11/2023    HDL 48.7 11/11/2023    CHHDL 3.9 11/11/2023    VLDL 22 11/11/2023     Lab Results   Component Value Date    LDLCALC 120 (H) 11/11/2023    LDLCALC 152 (H) 04/10/2019     RENAL FUNCTION PANEL -   Lab Results   Component Value Date    GLUCOSE 105 (H) 11/18/2019     11/18/2019    K 3.9 11/18/2019     11/18/2019    CO2 24 11/18/2019    ANIONGAP 14 11/18/2019    BUN 20 11/18/2019    CREATININE 1.1 11/18/2019    CALCIUM 9.9 11/18/2019        No results found for: \"BNP\", \"HGBA1C\"    Last Cardiology Tests:  ECG:  No results found for this or any previous visit (from the past 4464 hours).     Echo:  Transthoracic Echo (TTE) Complete 04/05/2024      Ejection Fractions:  No results found for: \"EF\"    Cath:  No results found for this or any previous visit from the past 1095 days.      Stress Test:  Stress Test 11/17/2023      Cardiac Imaging:  No results found for this or any previous visit from the past 1095 days.        Lab review: I have personally reviewed the laboratory result(s)     Assessment/Plan   Problem List Items Addressed This Visit       Acute ST segment elevation myocardial infarction (Multi)    Overview     4/2019      "    Relevant Orders    ECG 12 lead (Clinic Performed)    ASHD (arteriosclerotic heart disease) - Primary    Overview     Status post/9/2019 inferior ST elevation myocardial infarction treated with drug-eluting stents to the LAD and RCA.  Subtotally occluded diagonal branch could not be wired.    No current angina or ischemic EKG changes  Has good activity level  On aspirin/PCSK9 inhibitor/beta-blocker  Discussed heart healthy lifestyle         Chest pain    Relevant Orders    ECG 12 lead (Clinic Performed)    History of ST elevation myocardial infarction (STEMI)    Relevant Orders    ECG 12 lead (Clinic Performed)    Lipid Panel    Follow Up In Cardiology    Hyperlipidemia    Overview     Patient was on high intensity statin therapy and despite this is 11/2023 lipid panel revealed an LDL of 120  PCSK9 inhibitor was added  Discussed heart healthy diet  Recheck lipids         Ischemic cardiomyopathy    Overview     Noted after 4/19 MRI  LVEF recovered to 55 to 60% per 4/2024 echo  Follow             Heart healthy diet=more plants the better  Lipids  Gerson Ahmadi,

## 2025-04-17 LAB
ATRIAL RATE: 90 BPM
P AXIS: 60 DEGREES
P OFFSET: 207 MS
P ONSET: 146 MS
PR INTERVAL: 146 MS
Q ONSET: 219 MS
QRS COUNT: 15 BEATS
QRS DURATION: 110 MS
QT INTERVAL: 338 MS
QTC CALCULATION(BAZETT): 413 MS
QTC FREDERICIA: 387 MS
R AXIS: 44 DEGREES
T AXIS: -6 DEGREES
T OFFSET: 388 MS
VENTRICULAR RATE: 90 BPM

## 2026-04-16 ENCOUNTER — APPOINTMENT (OUTPATIENT)
Dept: CARDIOLOGY | Facility: CLINIC | Age: 51
End: 2026-04-16
Payer: COMMERCIAL